# Patient Record
Sex: MALE | Race: OTHER | HISPANIC OR LATINO | URBAN - METROPOLITAN AREA
[De-identification: names, ages, dates, MRNs, and addresses within clinical notes are randomized per-mention and may not be internally consistent; named-entity substitution may affect disease eponyms.]

---

## 2020-03-16 ENCOUNTER — EMERGENCY (EMERGENCY)
Facility: HOSPITAL | Age: 50
LOS: 0 days | Discharge: HOME | End: 2020-03-16
Attending: EMERGENCY MEDICINE | Admitting: EMERGENCY MEDICINE
Payer: SUBSIDIZED

## 2020-03-16 VITALS
RESPIRATION RATE: 18 BRPM | OXYGEN SATURATION: 98 % | TEMPERATURE: 98 F | DIASTOLIC BLOOD PRESSURE: 91 MMHG | HEART RATE: 82 BPM | SYSTOLIC BLOOD PRESSURE: 135 MMHG

## 2020-03-16 VITALS
TEMPERATURE: 98 F | HEART RATE: 90 BPM | SYSTOLIC BLOOD PRESSURE: 173 MMHG | DIASTOLIC BLOOD PRESSURE: 105 MMHG | OXYGEN SATURATION: 98 % | RESPIRATION RATE: 18 BRPM

## 2020-03-16 DIAGNOSIS — I10 ESSENTIAL (PRIMARY) HYPERTENSION: ICD-10-CM

## 2020-03-16 DIAGNOSIS — E11.9 TYPE 2 DIABETES MELLITUS WITHOUT COMPLICATIONS: ICD-10-CM

## 2020-03-16 DIAGNOSIS — K40.90 UNILATERAL INGUINAL HERNIA, WITHOUT OBSTRUCTION OR GANGRENE, NOT SPECIFIED AS RECURRENT: ICD-10-CM

## 2020-03-16 LAB
ALBUMIN SERPL ELPH-MCNC: 4.5 G/DL — SIGNIFICANT CHANGE UP (ref 3.5–5.2)
ALP SERPL-CCNC: 124 U/L — HIGH (ref 30–115)
ALT FLD-CCNC: 90 U/L — HIGH (ref 0–41)
ANION GAP SERPL CALC-SCNC: 13 MMOL/L — SIGNIFICANT CHANGE UP (ref 7–14)
AST SERPL-CCNC: 51 U/L — HIGH (ref 0–41)
BASOPHILS # BLD AUTO: 0.04 K/UL — SIGNIFICANT CHANGE UP (ref 0–0.2)
BASOPHILS NFR BLD AUTO: 0.6 % — SIGNIFICANT CHANGE UP (ref 0–1)
BILIRUB DIRECT SERPL-MCNC: <0.2 MG/DL — SIGNIFICANT CHANGE UP (ref 0–0.2)
BILIRUB INDIRECT FLD-MCNC: >0.1 MG/DL — LOW (ref 0.2–1.2)
BILIRUB SERPL-MCNC: 0.3 MG/DL — SIGNIFICANT CHANGE UP (ref 0.2–1.2)
BUN SERPL-MCNC: 16 MG/DL — SIGNIFICANT CHANGE UP (ref 10–20)
CALCIUM SERPL-MCNC: 9.3 MG/DL — SIGNIFICANT CHANGE UP (ref 8.5–10.1)
CHLORIDE SERPL-SCNC: 102 MMOL/L — SIGNIFICANT CHANGE UP (ref 98–110)
CO2 SERPL-SCNC: 25 MMOL/L — SIGNIFICANT CHANGE UP (ref 17–32)
CREAT SERPL-MCNC: 0.7 MG/DL — SIGNIFICANT CHANGE UP (ref 0.7–1.5)
EOSINOPHIL # BLD AUTO: 0.33 K/UL — SIGNIFICANT CHANGE UP (ref 0–0.7)
EOSINOPHIL NFR BLD AUTO: 5 % — SIGNIFICANT CHANGE UP (ref 0–8)
GLUCOSE SERPL-MCNC: 179 MG/DL — HIGH (ref 70–99)
HCT VFR BLD CALC: 51.4 % — SIGNIFICANT CHANGE UP (ref 42–52)
HGB BLD-MCNC: 18.3 G/DL — HIGH (ref 14–18)
IMM GRANULOCYTES NFR BLD AUTO: 0.3 % — SIGNIFICANT CHANGE UP (ref 0.1–0.3)
LACTATE SERPL-SCNC: 1.3 MMOL/L — SIGNIFICANT CHANGE UP (ref 0.7–2)
LIDOCAIN IGE QN: 62 U/L — HIGH (ref 7–60)
LYMPHOCYTES # BLD AUTO: 1.88 K/UL — SIGNIFICANT CHANGE UP (ref 1.2–3.4)
LYMPHOCYTES # BLD AUTO: 28.3 % — SIGNIFICANT CHANGE UP (ref 20.5–51.1)
MCHC RBC-ENTMCNC: 31.8 PG — HIGH (ref 27–31)
MCHC RBC-ENTMCNC: 35.6 G/DL — SIGNIFICANT CHANGE UP (ref 32–37)
MCV RBC AUTO: 89.2 FL — SIGNIFICANT CHANGE UP (ref 80–94)
MONOCYTES # BLD AUTO: 0.5 K/UL — SIGNIFICANT CHANGE UP (ref 0.1–0.6)
MONOCYTES NFR BLD AUTO: 7.5 % — SIGNIFICANT CHANGE UP (ref 1.7–9.3)
NEUTROPHILS # BLD AUTO: 3.87 K/UL — SIGNIFICANT CHANGE UP (ref 1.4–6.5)
NEUTROPHILS NFR BLD AUTO: 58.3 % — SIGNIFICANT CHANGE UP (ref 42.2–75.2)
NRBC # BLD: 0 /100 WBCS — SIGNIFICANT CHANGE UP (ref 0–0)
PLATELET # BLD AUTO: 198 K/UL — SIGNIFICANT CHANGE UP (ref 130–400)
POTASSIUM SERPL-MCNC: 5.1 MMOL/L — HIGH (ref 3.5–5)
POTASSIUM SERPL-SCNC: 5.1 MMOL/L — HIGH (ref 3.5–5)
PROT SERPL-MCNC: 8.2 G/DL — HIGH (ref 6–8)
RBC # BLD: 5.76 M/UL — SIGNIFICANT CHANGE UP (ref 4.7–6.1)
RBC # FLD: 12.7 % — SIGNIFICANT CHANGE UP (ref 11.5–14.5)
SODIUM SERPL-SCNC: 140 MMOL/L — SIGNIFICANT CHANGE UP (ref 135–146)
WBC # BLD: 6.64 K/UL — SIGNIFICANT CHANGE UP (ref 4.8–10.8)
WBC # FLD AUTO: 6.64 K/UL — SIGNIFICANT CHANGE UP (ref 4.8–10.8)

## 2020-03-16 PROCEDURE — 99285 EMERGENCY DEPT VISIT HI MDM: CPT

## 2020-03-16 PROCEDURE — 74177 CT ABD & PELVIS W/CONTRAST: CPT | Mod: 26

## 2020-03-16 RX ORDER — SODIUM CHLORIDE 9 MG/ML
1000 INJECTION INTRAMUSCULAR; INTRAVENOUS; SUBCUTANEOUS ONCE
Refills: 0 | Status: COMPLETED | OUTPATIENT
Start: 2020-03-16 | End: 2020-03-16

## 2020-03-16 RX ADMIN — SODIUM CHLORIDE 1000 MILLILITER(S): 9 INJECTION INTRAMUSCULAR; INTRAVENOUS; SUBCUTANEOUS at 14:25

## 2020-03-16 NOTE — ED PROVIDER NOTE - CLINICAL SUMMARY MEDICAL DECISION MAKING FREE TEXT BOX
50 yo male with DM, HTN, and presents to the ER for right inguinal hernia pain x 3 weeks . Pt states pain worse with cough/straining with stool. No fever/chills/N/V/D. Pt  had labs, and CT ordered. Seen by Dr Ojeda, who endorsed to me to follow up the CT scan. CT scan no obstruction, fat containing hernia. Examined, no skin changes, mild tenderness to hernia but unchanged from past several weeks. Recommend follow upwith surgery in office and return for any worsening .

## 2020-03-16 NOTE — ED ADULT NURSE NOTE - NSIMPLEMENTINTERV_GEN_ALL_ED
Implemented All Universal Safety Interventions:  [place] to call system. Call bell, personal items and telephone within reach. Instruct patient to call for assistance. Room bathroom lighting operational. Non-slip footwear when patient is off stretcher. Physically safe environment: no spills, clutter or unnecessary equipment. Stretcher in lowest position, wheels locked, appropriate side rails in place.

## 2020-03-16 NOTE — ED PROVIDER NOTE - PATIENT PORTAL LINK FT
You can access the FollowMyHealth Patient Portal offered by [facility] by registering at the following website: [email or web address]. By joining [employer]’s FollowMyHealth portal, you will also be able to view your health information using other applications (apps) compatible with our system.

## 2020-03-16 NOTE — ED PROVIDER NOTE - OBJECTIVE STATEMENT
49 year old M with hx of DM, HTN c/o rt inguinal hernia. Pt sts has had hernia x 4 years but has been having worsening pain x 3 weeks. Pain is worse with standing/bearing down/ having bowel movements and relieved with laying down. No assoc fever/chills, nausea, vomiting, surgical hx, bloody stools, urinary symptoms, chest pain, sob.

## 2020-03-16 NOTE — ED ADULT TRIAGE NOTE - MODE OF ARRIVAL
[employer] ambulatory encounter  URGENT CARE OFFICE VISIT    SUBJECTIVE:  Jody L Bence is a 49 year old female who presented requesting evaluation for left shoulder pain.  Patient indicates he was moving boxes on Saturday evening, reached for a box and stood with it and instantly felt a tearing stretching pulling pain in her left back, since then she's had significant pain in that area of her back with movement and lifting of her left arm as well, also indicates trouble sleeping.  She does indicate a history of back pain similar in nature    This is a: new problem  Onset / duration: 2 days  Symptoms: Pain in the left middle back, para ear pulling sensation, trouble sleeping, pain in the back with movement of the left shoulder  Pertinent negatives: Denies headaches changes in vision or hearing, fevers chills sweats, denies chest pain palpitation racing heartbeat, denies SOB MORENO cough, denies nausea vomiting diarrhea, denies rashes skin changes wounds that will not heal, denies numbness tingling or affected gait, denies urinary burning frequency or urge  Aggravating factors: Movement  Relieving factors: Rest  Treatments include: Ibuprofen and Tylenol    Review of systems:  .  review of systems was performed and findings relevant to these symptoms are included in the HPI.   10 point review of symptoms otherwise unremarkable    PAST HISTORIES:    ALLERGIES:   Allergen Reactions   • Aspirin NAUSEA     Higher doses   • Bactrim RASH and SWELLING   • Augmentin [Amoclan] Nausea & Vomiting       MEDICATIONS:  Current Outpatient Medications   Medication Sig   • metformin (GLUCOPHAGE-XR) 500 MG 24 hr tablet TAKE 2 TABLETS BY MOUTH TWICE DAILY WITH MEALS   • VICTOZA 18 MG/3ML pen-injector INJECT 1.2 MG INTO THE SKIN DAILY   • albuterol (PROAIR HFA) 108 (90 Base) MCG/ACT inhaler Inhale 2 puffs into the lungs every 4 hours as needed for Shortness of Breath or Wheezing.   • Insulin Pen Needle (B-D U/F PEN NEEDLE 5/16\") 31G X 8 MM Misc  Use with victoza daily. DM1, E11.65   • omega-3 acid ethyl esters (LOVAZA) 1 g capsule Take 2 capsules by mouth 2 times daily.   • citalopram (CELEXA) 20 MG tablet Take 1 tablet by mouth daily.   • HUMALOG 100 UNIT/ML injectable solution FOR USE WITH INSULIN PUMP. MAX DOSE 80 UNITS A DAY   • blood glucose (ONE TOUCH ULTRA TEST) test strip Use to check blood sugar 4 times daily as directed.  Type 2 E11.8   • moexipril-hydroCHLOROthiazide 15-12.5 MG tablet Take 1 tablet by mouth daily.   • azelastine (ASTEPRO) 0.15 % nasal spray Spray 2 sprays in each nostril 2 times daily.   • amLODIPine (NORVASC) 10 MG tablet Take 1 tablet by mouth daily.   • metoPROLOL succinate (TOPROL-XL) 25 MG 24 hr tablet Take 1 tablet by mouth daily.   • DISPENSE accucheck Aleva Plus glucometer monitor glucose 4 x per day. E11.65   • VICTOZA 18 MG/3ML pen-injector INJECT 1.2 MG INTO THE SKIN DAILY   • atorvastatin (LIPITOR) 80 MG tablet Take 1 tablet by mouth daily.   • fluticasone (FLONASE) 50 MCG/ACT nasal spray Spray 2 sprays in each nostril daily.   • DISPENSE Diabetic lancets and strips for accucheck Dylan aleva plus monitor 5-6 x per day, hgb 1 ac 6.o feb 2017   • Lancets (ONETOUCH ULTRASOFT) Misc TEST SIX TIMES PER DAY   • aspirin 81 MG chewable tablet Chew 81 mg by mouth daily.   • insulin subQ pump Inject 5 Units into the skin continuous. Humalog insulin.   • naproxen (NAPROSYN) 500 MG tablet Take 1 tablet by mouth 2 times daily.   • cyclobenzaprine (FLEXERIL) 10 MG tablet Take 1 tablet by mouth 3 times daily as needed for Muscle spasms.     No current facility-administered medications for this visit.        Past Medical History:   Diagnosis Date   • Abnormal stress test 3/13   • Chronic pain     left knee   • COPD (chronic obstructive pulmonary disease) (CMS/HCC)    • Depression    • Diabetes mellitus (CMS/HCC)     insulin pump and SQ insulin, type 2   • Gastroesophageal reflux disease    • HTN (hypertension)    • Insomnia    • Insulin  pump in place    • Knee pain, bilateral    • Lateral epicondylitis of right elbow 9/3/2018   • Obesity    • Other and unspecified hyperlipidemia     PT. TREATED FOR CONDITION   • Pneumonia     PT. STATES MAYBE THREE TIMES IN THE PAST   • PONV (postoperative nausea and vomiting)    • RAD (reactive airway disease)    • Smoker     electronic cigarette   • Solitary kidney     FUNCTIONAL    • Unspecified sinusitis (chronic)    • Urinary tract infection 02/2013    Asymtomatic treated with Cipro in 3/13   • Wears glasses        OBJECTIVE:  Physical Exam:    Visit Vitals  /70 (BP Location: RUE, Patient Position: Sitting, Cuff Size: Regular)   Pulse 74   Temp 97.7 °F (36.5 °C) (Oral)   Resp 20   Ht 5' 3\" (1.6 m)   Wt 98.1 kg   SpO2 99%   BMI 38.31 kg/m²        CONSTITUTIONAL: Well-hydrated, well-nourished female who appears to be in no acute distress. Awake, alert and cooperative.  HEENT: Sclerae are clear bilateral, EOM intact bilateral  NECK:  There is full range of motion. There is no tenderness noted.  LUNGS: Sounds distant in the upper lobes, bases are clear throughout  No wheezes, rales or rhonchi noted.  Good excursion, good effort  BACK: No midline tenderness, gross deformities, or step-offs appreciated; there is no CVA tenderness bilateral  There is marked point tenderness over the paraspinals of the left middle back at approximately the T 8 level, this pain does move superiorly and laterally up into the superior spinatus and inferior spinatus as well, there is a palpable muscle spasm of the paraspinal at that  CARDIOVASCULAR: S1 and S2 appreciated, no rubs murmurs or gallops noted  ABDOMEN: Soft, nontender, no guarding or rebound  MUSCULOSKELETAL:   Specialized testing of the left shoulder for impingement or rotator cuff pathology is unremarkable, there is reduced passive and active range of motion secondary to left mid back pain   Otherwise upper and lower extremities demonstrate 5/5 strength throughout, are  neurovascularly intact, and demonstrate appropriate muscle mass and tone for age and gender  SKIN: Warm, dry, intact without rash or lesion.  NEUROLOGIC: Alert and oriented x3.  PSYCHIATRIC:  Speech and behavior appropriate. Normal mood and affect.    DIAGNOSTICS:  None    URGENT CARE COURSE:  Patient presents with approximately 2 day complaint of left mid upper back pain, secondary to lifting an object in her basement.  Physical exam demonstrates no red flags, consistent with muscle spasm and strain of the back muscles.  Recommended conservative course with naproxen and Flexeril, referral to PT if patient so chooses, patient questioned other OTC methods for symptom management, this provider advised patient that if it subjectively improves her symptoms she is more than welcome to use it, reinforced early movement, as much as tolerated    Assessment / Plan:  1. Strain of mid-back, initial encounter  History and presentation consistent with muscle strain of the paraspinals and possibly accessory muscles of the left side mid to upper back, no red flag signs or symptoms that would be concerning for either rotator cuff pathology or other more malevolent pathology.  - SERVICE TO PHYSICAL THERAPY      Orders Placed This Encounter   • SERVICE TO PHYSICAL THERAPY   • naproxen (NAPROSYN) 500 MG tablet   • cyclobenzaprine (FLEXERIL) 10 MG tablet       Recommend naproxen b.i.d. with food, Flexeril p.r.n. muscle spasm, warned patient of side effect of drowsiness with Flexeril, patient aware.  Recommended early mobility, patient may use referral to physical therapy if symptoms do not resolve    FOLLOW-UP:  PRN with PCP / specialist as indicated    PATIENT INSTRUCTIONS:  AVS printed, reviewed with patient, questions answered, patient discharged to home    The patient was advised to follow up with primary physician or to recheck with the urgent care clinic sooner if symptoms get worse or if new symptoms appear.    The patient  indicated understanding of the diagnosis and agreed with the plan of care.     Stepan Castillo MD   1:06 PM   [employer]

## 2020-03-16 NOTE — ED PROVIDER NOTE - PHYSICAL EXAMINATION
CONSTITUTIONAL: Well-appearing; well-nourished; in no apparent distress.   EYES: PERRL; EOM intact.   ENT: normal nose; no rhinorrhea; normal pharynx with no tonsillar hypertrophy.   NECK: Supple; non-tender; no cervical lymphadenopathy.  CARDIOVASCULAR: Normal S1, S2; no murmurs, rubs, or gallops.   RESPIRATORY: Normal chest excursion with respiration; breath sounds clear and equal bilaterally; no wheezes, rhonchi, or rales.  GI/: Normal bowel sounds; non-distended; non-tender;   + non reducible rt inguinal hernia with ttp. No skin changes/erythema/warmth.  MS: No evidence of trauma or deformity. Normal ROM in all four extremities; non-tender to palpation; distal pulses are normal.   SKIN: Normal for age and race; warm; dry; good turgor; no apparent lesions or exudate.   NEURO/PSYCH: A & O x 4; grossly unremarkable. mood and manner are appropriate.

## 2020-03-16 NOTE — ED PROVIDER NOTE - NS ED ROS FT
Constitutional: no fever, chills, no recent weight loss, change in appetite or malaise  Eyes: no redness/discharge/pain/vision changes  ENT: no rhinorrhea/ear pain/sore throat  Cardiac: No chest pain, SOB or edema.  Respiratory: No cough or respiratory distress  GI: + rt inguinal hernia. No nausea, vomiting, diarrhea   : No dysuria, frequency, urgency or hematuria  MS: no pain to back or extremities, no loss of ROM, no weakness  Neuro: No headache or weakness. No LOC.  Skin: No skin rash.  Endocrine: + hx of DM  Except as documented in the HPI, all other systems are negative.

## 2020-03-16 NOTE — ED PROVIDER NOTE - NSFOLLOWUPCLINICS_GEN_ALL_ED_FT
[facility]  Surgery  [address] [place] [address]  Phone: [phone]  Fax:   Follow Up Time:

## 2020-03-16 NOTE — ED PROVIDER NOTE - ATTENDING CONTRIBUTION TO CARE
48 y/o male h/o DM, HTN, rt inguinal hernia c/o rt inguinal pain x several weeks, becoming progressively worse, sx are persistent, passing stool and flatus, denies fever, n/v/d, anorexia, cough, respiratory sx, change in bowel habits or urinary sx, penile d/c or other associated complaints at present.     No old chart available for review.  I have reviewed and agree with the nursing note, except as documented in my note.    VSS, awake, alert, non-toxic appearing, in NAD, no scleral icterus, oropharynx clear, mmm, no JVD or bruit, no jaundice, skin rash or lesions, chest CTAB, non-labored breathing, no w/r/r, +S1/S2, RRR, no m/r/g, abdomen soft, NT, ND, +BS, no scars or distention, no pulsatile masses or bruits appreciated, + non reducible rt inguinal hernia, no CVA tenderness, no peripheral edema or deformities, alert, clear speech and steady gait.

## 2022-02-25 ENCOUNTER — EMERGENCY (EMERGENCY)
Facility: HOSPITAL | Age: 52
LOS: 0 days | Discharge: HOME | End: 2022-02-25
Attending: EMERGENCY MEDICINE | Admitting: EMERGENCY MEDICINE
Payer: MEDICAID

## 2022-02-25 VITALS
WEIGHT: 158.07 LBS | TEMPERATURE: 99 F | SYSTOLIC BLOOD PRESSURE: 171 MMHG | RESPIRATION RATE: 18 BRPM | HEART RATE: 96 BPM | OXYGEN SATURATION: 97 % | DIASTOLIC BLOOD PRESSURE: 87 MMHG | HEIGHT: 66 IN

## 2022-02-25 DIAGNOSIS — K62.89 OTHER SPECIFIED DISEASES OF ANUS AND RECTUM: ICD-10-CM

## 2022-02-25 DIAGNOSIS — I10 ESSENTIAL (PRIMARY) HYPERTENSION: ICD-10-CM

## 2022-02-25 DIAGNOSIS — E11.9 TYPE 2 DIABETES MELLITUS WITHOUT COMPLICATIONS: ICD-10-CM

## 2022-02-25 LAB
ALBUMIN SERPL ELPH-MCNC: 4.5 G/DL — SIGNIFICANT CHANGE UP (ref 3.5–5.2)
ALP SERPL-CCNC: 151 U/L — HIGH (ref 30–115)
ALT FLD-CCNC: <5 U/L — SIGNIFICANT CHANGE UP (ref 0–41)
ANION GAP SERPL CALC-SCNC: 13 MMOL/L — SIGNIFICANT CHANGE UP (ref 7–14)
AST SERPL-CCNC: 93 U/L — HIGH (ref 0–41)
BASOPHILS # BLD AUTO: 0.04 K/UL — SIGNIFICANT CHANGE UP (ref 0–0.2)
BASOPHILS NFR BLD AUTO: 0.5 % — SIGNIFICANT CHANGE UP (ref 0–1)
BILIRUB SERPL-MCNC: 0.2 MG/DL — SIGNIFICANT CHANGE UP (ref 0.2–1.2)
BUN SERPL-MCNC: 23 MG/DL — HIGH (ref 10–20)
CALCIUM SERPL-MCNC: 8.7 MG/DL — SIGNIFICANT CHANGE UP (ref 8.5–10.1)
CHLORIDE SERPL-SCNC: 100 MMOL/L — SIGNIFICANT CHANGE UP (ref 98–110)
CO2 SERPL-SCNC: 18 MMOL/L — SIGNIFICANT CHANGE UP (ref 17–32)
CREAT SERPL-MCNC: 0.8 MG/DL — SIGNIFICANT CHANGE UP (ref 0.7–1.5)
EOSINOPHIL # BLD AUTO: 0.28 K/UL — SIGNIFICANT CHANGE UP (ref 0–0.7)
EOSINOPHIL NFR BLD AUTO: 3.4 % — SIGNIFICANT CHANGE UP (ref 0–8)
GLUCOSE SERPL-MCNC: 232 MG/DL — HIGH (ref 70–99)
HCT VFR BLD CALC: 49.7 % — SIGNIFICANT CHANGE UP (ref 42–52)
HGB BLD-MCNC: 17.4 G/DL — SIGNIFICANT CHANGE UP (ref 14–18)
IMM GRANULOCYTES NFR BLD AUTO: 0.2 % — SIGNIFICANT CHANGE UP (ref 0.1–0.3)
LYMPHOCYTES # BLD AUTO: 2.24 K/UL — SIGNIFICANT CHANGE UP (ref 1.2–3.4)
LYMPHOCYTES # BLD AUTO: 27 % — SIGNIFICANT CHANGE UP (ref 20.5–51.1)
MCHC RBC-ENTMCNC: 30.1 PG — SIGNIFICANT CHANGE UP (ref 27–31)
MCHC RBC-ENTMCNC: 35 G/DL — SIGNIFICANT CHANGE UP (ref 32–37)
MCV RBC AUTO: 86 FL — SIGNIFICANT CHANGE UP (ref 80–94)
MONOCYTES # BLD AUTO: 0.55 K/UL — SIGNIFICANT CHANGE UP (ref 0.1–0.6)
MONOCYTES NFR BLD AUTO: 6.6 % — SIGNIFICANT CHANGE UP (ref 1.7–9.3)
NEUTROPHILS # BLD AUTO: 5.17 K/UL — SIGNIFICANT CHANGE UP (ref 1.4–6.5)
NEUTROPHILS NFR BLD AUTO: 62.3 % — SIGNIFICANT CHANGE UP (ref 42.2–75.2)
NRBC # BLD: 0 /100 WBCS — SIGNIFICANT CHANGE UP (ref 0–0)
PLATELET # BLD AUTO: 217 K/UL — SIGNIFICANT CHANGE UP (ref 130–400)
POTASSIUM SERPL-MCNC: SIGNIFICANT CHANGE UP MMOL/L (ref 3.5–5)
POTASSIUM SERPL-SCNC: SIGNIFICANT CHANGE UP MMOL/L (ref 3.5–5)
PROT SERPL-MCNC: 8.3 G/DL — HIGH (ref 6–8)
RBC # BLD: 5.78 M/UL — SIGNIFICANT CHANGE UP (ref 4.7–6.1)
RBC # FLD: 13 % — SIGNIFICANT CHANGE UP (ref 11.5–14.5)
SODIUM SERPL-SCNC: 131 MMOL/L — LOW (ref 135–146)
WBC # BLD: 8.3 K/UL — SIGNIFICANT CHANGE UP (ref 4.8–10.8)
WBC # FLD AUTO: 8.3 K/UL — SIGNIFICANT CHANGE UP (ref 4.8–10.8)

## 2022-02-25 PROCEDURE — 99284 EMERGENCY DEPT VISIT MOD MDM: CPT

## 2022-02-25 NOTE — ED ADULT TRIAGE NOTE - CHIEF COMPLAINT QUOTE
Patient states he has noticed blood after each BM since this morning and worsening as day progressed. Patient denies abdominal pr rectal pain. Patient also reports R sided ear discomfort with a decreased in hearing.

## 2022-02-25 NOTE — ED PROVIDER NOTE - PHYSICAL EXAMINATION
CONSTITUTIONAL: Well-developed; well-nourished; in no acute distress.   SKIN: warm, dry  HEAD: Normocephalic  EYES: no conjunctival injection  ENT: +cerumen in b/l ears  NECK: Supple; non tender.  CARD: S1, S2 normal;  Regular rate and rhythm.   RESP: No wheezes, rales or rhonchi.  ABD: soft ntnd  EXT: Normal ROM.  No clubbing, cyanosis or edema.    (chaperoned by Dr. Stephens): no blood per rectum, no hemorrhoids, soft brown stool  NEURO: Alert, oriented, grossly unremarkable.  PSYCH: Cooperative, appropriate.

## 2022-02-25 NOTE — ED PROVIDER NOTE - PATIENT PORTAL LINK FT
You can access the FollowMyHealth Patient Portal offered by [facility] by registering at the following website: [email or web address]. By joining "[employer]’s FollowMyHealth portal, you will also be able to view your health information using other applications (apps) compatible with our system.

## 2022-02-25 NOTE — ED PROVIDER NOTE - CLINICAL SUMMARY MEDICAL DECISION MAKING FREE TEXT BOX
51 y.o. male, PMHx of HTN, DM presents with rectal bleeding since today. Patient states he was in the shower and was washing his rectum and put his fingers in too far and noticed some blood on his finger. Since then pt had two episodes of bright red blood with bowel movements when wiping. No blood on the underwear. No weakness. No bleeding prior to the episode. Unrelated, he states he has had decreased hearing in the right ear. Denies fevers, chills, URI symptoms, chest pain, SOB, dizziness, nausea, vomiting, diarrhea, constipation, abdominal pain, rectal pain. Patient states he is a daily drinker (5 beers) last drink yesterday. On exam, pt in NAD, AAOx3, head NC/AT, CN II-XII intact, TM (-) erythema/bulging, lungs CTA B/L, CV S1S2 regular, abdomen soft/NT/ND/(+)BS, ext (-) edema, motor 5/5x4, sensation intact. Rectal as per resident's note. Labs reviewed. Will d/c with GI follow up. Advised to avoid putting fingers into his rectum.

## 2022-02-25 NOTE — ED PROVIDER NOTE - OBJECTIVE STATEMENT
50 yo male, PMHx of HTN, DM presents with rectal bleeding since today. Patient states he was in the shower and was washing his rectum but "put my finger in too far and felt break and had a little blood on my finger." He then had two episodes of bright red blood with bowel movements when wiping. Unrelated, he states he has had decreased hearing in the right ear. Denies fevers, chills, URI symptoms, chest pain, SOB, dizziness, nausea, vomiting, diarrhea, constipation, abdominal pain, rectal pain. Patient states he is a daily drinker (5 beers) last drink yesterday.

## 2022-02-25 NOTE — ED PROVIDER NOTE - NSFOLLOWUPINSTRUCTIONS_ED_ALL_ED_FT
Our Emergency Department Referral Coordinators will be reaching out ot you in the next 24-48 hours from 9:00am to 5:00pm (Monday to Friday) with a follow up appointment. Please expect a phone call from the hospital in that time frame. If you do not receive a call or if you have any questions or concerns, you can reach them at [phone] or [phone].      Rectal Bleeding    WHAT YOU NEED TO KNOW:    Rectal bleeding can be caused by constipation, hemorrhoids, or anal fissures. It may also be caused by polyps, tumors, or medical conditions, such as colitis or diverticulitis.    DISCHARGE INSTRUCTIONS:    Medicines:   •Pain medicine: You may be given medicine to take away or decrease pain. Do not wait until the pain is severe before you take your medicine.      •Iron supplement: Iron helps your body make more red blood cells.       •Steroids: This medicine decreases inflammation in your rectum. It may be applied as a cream, ointment, or lotion.      •Take your medicine as directed. Contact your healthcare provider if you think your medicine is not helping or if you have side effects. Tell him or her if you are allergic to any medicine. Keep a list of the medicines, vitamins, and herbs you take. Include the amounts, and when and why you take them. Bring the list or the pill bottles to follow-up visits. Carry your medicine list with you in case of an emergency.      Follow up with your doctor as directed: Write down your questions so you remember to ask them during your visits.     Drink liquids as directed: Ask your healthcare provider how much liquid to drink each day and which liquids are best for you. This will help prevent dehydration and constipation.    Contact your healthcare provider if:   •You have a fever.      •Your rectal bleeding stopped for a time, but has started again.       •You have nausea.      •You have cold, sweaty, pale skin.      •You have changes in your bowel movements, such as diarrhea.      •You have questions or concerns about your condition or care.      Seek care immediately or call 911 if:   •You are breathing faster than usual.      •You are dizzy, lightheaded, or feel faint.      •You are confused or cannot think clearly.      •You urinate less than usual or not at all.      •Your rectal bleeding is constant or heavy.      •You have severe abdominal pain or cramping.

## 2022-02-26 PROBLEM — Z78.9 OTHER SPECIFIED HEALTH STATUS: Chronic | Status: ACTIVE | Noted: 2020-03-16

## 2022-05-17 PROBLEM — Z00.00 ENCOUNTER FOR PREVENTIVE HEALTH EXAMINATION: Status: ACTIVE | Noted: 2022-05-17

## 2022-06-09 ENCOUNTER — APPOINTMENT (OUTPATIENT)
Dept: UROLOGY | Facility: CLINIC | Age: 52
End: 2022-06-09

## 2022-06-23 ENCOUNTER — APPOINTMENT (OUTPATIENT)
Dept: UROLOGY | Facility: CLINIC | Age: 52
End: 2022-06-23

## 2022-06-23 ENCOUNTER — OUTPATIENT (OUTPATIENT)
Dept: OUTPATIENT SERVICES | Facility: HOSPITAL | Age: 52
LOS: 1 days | Discharge: HOME | End: 2022-06-23

## 2022-06-23 VITALS
BODY MASS INDEX: 28.53 KG/M2 | HEIGHT: 63 IN | HEART RATE: 74 BPM | DIASTOLIC BLOOD PRESSURE: 85 MMHG | WEIGHT: 161 LBS | TEMPERATURE: 97.2 F | SYSTOLIC BLOOD PRESSURE: 140 MMHG | OXYGEN SATURATION: 98 %

## 2022-06-23 DIAGNOSIS — R97.20 ELEVATED PROSTATE, SPECIFIC ANTIGEN [PSA]: ICD-10-CM

## 2022-06-23 PROCEDURE — 99203 OFFICE O/P NEW LOW 30 MIN: CPT

## 2022-06-23 NOTE — ASSESSMENT
[FreeTextEntry1] : This is a 52 year male who presents for eval for elevated psa\par \par May 2022\par PSA 4.4, % free = 8\par \par he denies any other medical problems\par no burning with urination or blood in the urine.\par \par no family history of  cancers and no history of smoking

## 2022-07-01 DIAGNOSIS — R97.20 ELEVATED PROSTATE SPECIFIC ANTIGEN [PSA]: ICD-10-CM

## 2022-09-29 ENCOUNTER — APPOINTMENT (OUTPATIENT)
Dept: UROLOGY | Facility: CLINIC | Age: 52
End: 2022-09-29

## 2024-10-16 ENCOUNTER — EMERGENCY (EMERGENCY)
Facility: HOSPITAL | Age: 54
LOS: 0 days | Discharge: HOME | End: 2024-10-16
Attending: EMERGENCY MEDICINE
Payer: MEDICAID

## 2024-10-16 VITALS
WEIGHT: 156.97 LBS | TEMPERATURE: 98 F | HEART RATE: 93 BPM | HEIGHT: 63 IN | OXYGEN SATURATION: 98 % | RESPIRATION RATE: 18 BRPM | DIASTOLIC BLOOD PRESSURE: 117 MMHG | SYSTOLIC BLOOD PRESSURE: 188 MMHG

## 2024-10-16 DIAGNOSIS — R07.89 OTHER CHEST PAIN: ICD-10-CM

## 2024-10-16 DIAGNOSIS — Z91.148 PATIENT'S OTHER NONCOMPLIANCE WITH MEDICATION REGIMEN FOR OTHER REASON: ICD-10-CM
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
## 2024-10-16 DIAGNOSIS — S60.512A ABRASION OF LEFT HAND, INITIAL ENCOUNTER: ICD-10-CM

## 2024-10-16 DIAGNOSIS — H10.029 OTHER MUCOPURULENT CONJUNCTIVITIS, UNSPECIFIED EYE: ICD-10-CM
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
## 2024-10-16 DIAGNOSIS — Z87.891 PERSONAL HISTORY OF NICOTINE DEPENDENCE: ICD-10-CM

## 2024-10-16 DIAGNOSIS — I10 ESSENTIAL (PRIMARY) HYPERTENSION: ICD-10-CM

## 2024-10-16 DIAGNOSIS — S20.20XA CONTUSION OF THORAX, UNSPECIFIED, INITIAL ENCOUNTER: ICD-10-CM

## 2024-10-16 DIAGNOSIS — E11.65 TYPE 2 DIABETES MELLITUS WITH HYPERGLYCEMIA: ICD-10-CM

## 2024-10-16 LAB
ALBUMIN SERPL ELPH-MCNC: 4.6 G/DL — SIGNIFICANT CHANGE UP (ref 3.5–5.2)
ALP SERPL-CCNC: 205 U/L — HIGH (ref 30–115)
ALT FLD-CCNC: 39 U/L — SIGNIFICANT CHANGE UP (ref 0–41)
ANION GAP SERPL CALC-SCNC: 13 MMOL/L — SIGNIFICANT CHANGE UP (ref 7–14)
APTT BLD: 33.1 SEC — SIGNIFICANT CHANGE UP (ref 27–39.2)
AST SERPL-CCNC: 26 U/L — SIGNIFICANT CHANGE UP (ref 0–41)
BASOPHILS # BLD AUTO: 0.06 K/UL — SIGNIFICANT CHANGE UP (ref 0–0.2)
BASOPHILS NFR BLD AUTO: 0.5 % — SIGNIFICANT CHANGE UP (ref 0–1)
BILIRUB SERPL-MCNC: 0.3 MG/DL — SIGNIFICANT CHANGE UP (ref 0.2–1.2)
BUN SERPL-MCNC: 18 MG/DL — SIGNIFICANT CHANGE UP (ref 10–20)
CALCIUM SERPL-MCNC: 9.4 MG/DL — SIGNIFICANT CHANGE UP (ref 8.4–10.5)
CHLORIDE SERPL-SCNC: 99 MMOL/L — SIGNIFICANT CHANGE UP (ref 98–110)
CO2 SERPL-SCNC: 23 MMOL/L — SIGNIFICANT CHANGE UP (ref 17–32)
CREAT SERPL-MCNC: 0.7 MG/DL — SIGNIFICANT CHANGE UP (ref 0.7–1.5)
EGFR: 109 ML/MIN/1.73M2 — SIGNIFICANT CHANGE UP
EOSINOPHIL # BLD AUTO: 0.06 K/UL — SIGNIFICANT CHANGE UP (ref 0–0.7)
EOSINOPHIL NFR BLD AUTO: 0.5 % — SIGNIFICANT CHANGE UP (ref 0–8)
ETHANOL SERPL-MCNC: <10 MG/DL — SIGNIFICANT CHANGE UP
GLUCOSE SERPL-MCNC: 341 MG/DL — HIGH (ref 70–99)
HCT VFR BLD CALC: 51 % — SIGNIFICANT CHANGE UP (ref 42–52)
HGB BLD-MCNC: 17.6 G/DL — SIGNIFICANT CHANGE UP (ref 14–18)
IMM GRANULOCYTES NFR BLD AUTO: 0.7 % — HIGH (ref 0.1–0.3)
INR BLD: 0.84 RATIO — SIGNIFICANT CHANGE UP (ref 0.65–1.3)
LYMPHOCYTES # BLD AUTO: 1.56 K/UL — SIGNIFICANT CHANGE UP (ref 1.2–3.4)
LYMPHOCYTES # BLD AUTO: 12.8 % — LOW (ref 20.5–51.1)
MCHC RBC-ENTMCNC: 30.2 PG — SIGNIFICANT CHANGE UP (ref 27–31)
MCHC RBC-ENTMCNC: 34.5 G/DL — SIGNIFICANT CHANGE UP (ref 32–37)
MCV RBC AUTO: 87.6 FL — SIGNIFICANT CHANGE UP (ref 80–94)
MONOCYTES # BLD AUTO: 0.44 K/UL — SIGNIFICANT CHANGE UP (ref 0.1–0.6)
MONOCYTES NFR BLD AUTO: 3.6 % — SIGNIFICANT CHANGE UP (ref 1.7–9.3)
NEUTROPHILS # BLD AUTO: 9.95 K/UL — HIGH (ref 1.4–6.5)
NEUTROPHILS NFR BLD AUTO: 81.9 % — HIGH (ref 42.2–75.2)
NRBC # BLD: 0 /100 WBCS — SIGNIFICANT CHANGE UP (ref 0–0)
PLATELET # BLD AUTO: 215 K/UL — SIGNIFICANT CHANGE UP (ref 130–400)
PMV BLD: 10.7 FL — HIGH (ref 7.4–10.4)
POTASSIUM SERPL-MCNC: 5.3 MMOL/L — HIGH (ref 3.5–5)
POTASSIUM SERPL-SCNC: 5.3 MMOL/L — HIGH (ref 3.5–5)
PROT SERPL-MCNC: 8.2 G/DL — HIGH (ref 6–8)
PROTHROM AB SERPL-ACNC: 9.6 SEC — LOW (ref 9.95–12.87)
RBC # BLD: 5.82 M/UL — SIGNIFICANT CHANGE UP (ref 4.7–6.1)
RBC # FLD: 13.1 % — SIGNIFICANT CHANGE UP (ref 11.5–14.5)
SODIUM SERPL-SCNC: 135 MMOL/L — SIGNIFICANT CHANGE UP (ref 135–146)
WBC # BLD: 12.15 K/UL — HIGH (ref 4.8–10.8)
WBC # FLD AUTO: 12.15 K/UL — HIGH (ref 4.8–10.8)

## 2024-10-16 PROCEDURE — 74177 CT ABD & PELVIS W/CONTRAST: CPT | Mod: MC

## 2024-10-16 PROCEDURE — 85610 PROTHROMBIN TIME: CPT

## 2024-10-16 PROCEDURE — 73130 X-RAY EXAM OF HAND: CPT | Mod: LT

## 2024-10-16 PROCEDURE — 82962 GLUCOSE BLOOD TEST: CPT

## 2024-10-16 PROCEDURE — 71260 CT THORAX DX C+: CPT | Mod: 26,MC

## 2024-10-16 PROCEDURE — 80307 DRUG TEST PRSMV CHEM ANLYZR: CPT

## 2024-10-16 PROCEDURE — 74177 CT ABD & PELVIS W/CONTRAST: CPT | Mod: 26,MC

## 2024-10-16 PROCEDURE — 85025 COMPLETE CBC W/AUTO DIFF WBC: CPT

## 2024-10-16 PROCEDURE — 36415 COLL VENOUS BLD VENIPUNCTURE: CPT

## 2024-10-16 PROCEDURE — 71260 CT THORAX DX C+: CPT | Mod: MC

## 2024-10-16 PROCEDURE — 96374 THER/PROPH/DIAG INJ IV PUSH: CPT | Mod: XU

## 2024-10-16 PROCEDURE — 80053 COMPREHEN METABOLIC PANEL: CPT

## 2024-10-16 PROCEDURE — 73130 X-RAY EXAM OF HAND: CPT | Mod: 26,LT

## 2024-10-16 PROCEDURE — 71045 X-RAY EXAM CHEST 1 VIEW: CPT | Mod: 26

## 2024-10-16 PROCEDURE — 85730 THROMBOPLASTIN TIME PARTIAL: CPT

## 2024-10-16 PROCEDURE — 99284 EMERGENCY DEPT VISIT MOD MDM: CPT | Mod: 25

## 2024-10-16 PROCEDURE — 71045 X-RAY EXAM CHEST 1 VIEW: CPT

## 2024-10-16 PROCEDURE — 99285 EMERGENCY DEPT VISIT HI MDM: CPT

## 2024-10-16 RX ORDER — MORPHINE SULFATE 30 MG/1
4 TABLET, FILM COATED, EXTENDED RELEASE ORAL ONCE
Refills: 0 | Status: DISCONTINUED | OUTPATIENT
Start: 2024-10-16 | End: 2024-10-16

## 2024-10-16 RX ADMIN — MORPHINE SULFATE 4 MILLIGRAM(S): 30 TABLET, FILM COATED, EXTENDED RELEASE ORAL at 10:58

## 2024-10-16 NOTE — ED ADULT TRIAGE NOTE - CHIEF COMPLAINT QUOTE
s/p trip and fall on sidewalk today, c/o left rib pain and abrasion to L hand. Pt stated he's unable to sit down due to diff breathing. unable to obtain ekg in triage.

## 2024-10-16 NOTE — ED PROVIDER NOTE - NSFOLLOWUPINSTRUCTIONS_ED_ALL_ED_FT
PLEASE TAKE YOUR DIABETES MEDICATIONS    Rib Contusion    A rib contusion is a deep bruise on your rib area. Contusions are the result of a blunt trauma that causes bleeding and injury to the tissues under the skin. A rib contusion may involve bruising of the ribs and of the skin and muscles in the area. The skin overlying the contusion may turn blue, purple, or yellow. Minor injuries will give you a painless contusion, but more severe contusions may stay painful and swollen for a few weeks.     CAUSES  A contusion is usually caused by a blow, trauma, or direct force to an area of the body. This often occurs while playing contact sports.    SYMPTOMS  Swelling and redness of the injured area.  Discoloration of the injured area.  Tenderness and soreness of the injured area.  Pain with or without movement.    DIAGNOSIS  The diagnosis can be made by taking a medical history and performing a physical exam. An X-ray, CT scan, or MRI may be needed to determine if there were any associated injuries, such as broken bones (fractures) or internal injuries.    TREATMENT  Often, the best treatment for a rib contusion is rest. Icing or applying cold compresses to the injured area may help reduce swelling and inflammation. Deep breathing exercises may be recommended to reduce the risk of partial lung collapse and pneumonia. Over-the-counter or prescription medicines may also be recommended for pain control.    HOME CARE INSTRUCTIONS  Apply ice to the injured area:   Put ice in a plastic bag.   Place a towel between your skin and the bag.   Leave the ice on for 20 minutes, 2–3 times per day.   Take medicines only as directed by your health care provider.   Rest the injured area. Avoid strenuous activity and any activities or movements that cause pain. Be careful during activities and avoid bumping the injured area.  Perform deep-breathing exercises as directed by your health care provider.   Do not lift anything that is heavier than 5 lb (2.3 kg) until your health care provider approves.   Do not use any tobacco products, including cigarettes, chewing tobacco, or electronic cigarettes. If you need help quitting, ask your health care provider.     SEEK MEDICAL CARE IF:  You have increased bruising or swelling.   You have pain that is not controlled with treatment.   You have a fever.

## 2024-10-16 NOTE — ED PROVIDER NOTE - PATIENT PORTAL LINK FT
You can access the FollowMyHealth Patient Portal offered by [facility] by registering at the following website: [email or web address]. By joining [employer]’s FollowMyHealth portal, you will also be able to view your health information using other applications (apps) compatible with our system.

## 2024-10-16 NOTE — ED PROVIDER NOTE - OBJECTIVE STATEMENT
pt presents pt with pmhx HTN DM not compliant with meds 2/2 daily etoh use presents to ED c/o L sided chest wall pain after mechanical fall on his way to work today. denies head injury/LOC. also hurt his L hand, tdap utd. pain is sharp, nonradiating, moderate. denies exacerbating or relieving factors.  Denies fever/chill/HA/dizziness/chest pain/palpitation/sob/abd pain/n/v/d/ black stool/bloody stool/urinary sxs

## 2024-10-16 NOTE — ED PROVIDER NOTE - NSFOLLOWUPCLINICS_GEN_ALL_ED_FT
A Family Medicine Doctor  Family Medicine  .  [place]   Phone:   Fax:     Alcoholics Anonymous - 24 hour hotline  Alcoholics Anonymous  .  [place]   Phone: [phone]  Fax:

## 2024-10-16 NOTE — ED PROVIDER NOTE - PHYSICAL EXAMINATION
Well-appearing, well-nourished male standing by stretcher, no acute distress, head atraumatic/normocephalic, PERRL, pink conjunctiva, MMM, no midline spine tenderness to palpation along its entire length,  speaking full sentences, equal air entry bilaterally, no chest wall lesions, there is tenderness to palpation over the left  anterolateral aspect of the lower thoracic cage, no palpable fracture or crepitus, abdomen nontender to palpation, full range of motion at all joints, there are superficial abrasions and mild swelling of the dorsum of the left hand, extremities are neurovascularly intact, patient is awake and alert x 3, no focal neuro deficits, normal mood and affect, pleasant and cooperative.

## 2024-10-16 NOTE — ED PROVIDER NOTE - ATTENDING APP SHARED VISIT CONTRIBUTION OF CARE
54-year-old male past med history of diabetes, hypertension, daily beer use, noncompliant with medication presenting for evaluation of left sided rib and left hand pain status post mechanical fall this morning.  Patient states he was trying to catch a bus to go to work and had a mechanical fall.  Denies LOC, was able to get up by himself, went back home and then called his brother asking him to take him to the hospital.  Patient thinks he may have broken a rib.  He denies any head injury, no headache, neck pain, dizziness or lightheadedness, no abdominal or back pain, no focal weakness or paresthesias.  Tetanus is up-to-date.  Last beer use last night, Denies any history of withdrawal. Well-appearing, well-nourished male standing by stretcher, no acute distress, head atraumatic/normocephalic, PERRL, pink conjunctiva, MMM, no midline spine tenderness to palpation along its entire length,  speaking full sentences, equal air entry bilaterally, no chest wall lesions, there is tenderness to palpation over the left  anterolateral aspect of the lower thoracic cage, no palpable fracture or crepitus, abdomen nontender to palpation, full range of motion at all joints, there are superficial abrasions and mild swelling of the dorsum of the left hand, extremities are neurovascularly intact, patient is awake and alert x 3, no focal neuro deficits, normal mood and affect, pleasant and cooperative.  Plan: Analgesia, imaging, wound care.,  Reassess.

## 2024-10-16 NOTE — ED ADULT NURSE NOTE - OBJECTIVE STATEMENT
Pt presents to the ED c/o rib pain s/p fall after losing his balance. Pt reports inability to sitdown due to the pain. Denies LOC, denies AC use

## 2024-10-16 NOTE — ED PROVIDER NOTE - CLINICAL SUMMARY MEDICAL DECISION MAKING FREE TEXT BOX
54-year-old male with chest wall contusion status post mechanical fall.  No loss of consciousness.  Patient was given dose of analgesia in ED, reported feeling better.  Imaging negative for acute traumatic process. X-ray films were independent interpreted by me, ED attending. Labs ordered and reviewed.  Glucose is elevated, patient admits to noncompliance with medication for 6 months due to drinking beer.  Stable for discharge home at present, advised to follow-up with his doctor in clinic where he usually gets care, strict return precautions given.  He verbalized understanding is amenable to plan. Collateral information obtained from the patient's brother.

## 2024-10-16 NOTE — ED PROVIDER NOTE - PROGRESS NOTE DETAILS
EP: Imaging negative for acute traumatic process.  Results of all test include incidental findings were discussed with the patient.  He was advised to follow-up with his PCP (Patient does not remember his doctor's name, he gets his care at a clinic) and start taking his diabetes medications.  Anticipatory guidance provided, strict return precautions given.

## 2024-10-16 NOTE — ED PROVIDER NOTE - CARE PLAN
1 Principal Discharge DX:	Fall  Secondary Diagnosis:	Rib injury  Secondary Diagnosis:	Hyperglycemia